# Patient Record
Sex: FEMALE | Race: WHITE | NOT HISPANIC OR LATINO | Employment: FULL TIME | URBAN - METROPOLITAN AREA
[De-identification: names, ages, dates, MRNs, and addresses within clinical notes are randomized per-mention and may not be internally consistent; named-entity substitution may affect disease eponyms.]

---

## 2017-11-22 ENCOUNTER — OFFICE VISIT (OUTPATIENT)
Dept: OBSTETRICS AND GYNECOLOGY | Facility: CLINIC | Age: 47
End: 2017-11-22

## 2017-11-22 VITALS
HEIGHT: 67 IN | WEIGHT: 293 LBS | DIASTOLIC BLOOD PRESSURE: 88 MMHG | SYSTOLIC BLOOD PRESSURE: 132 MMHG | BODY MASS INDEX: 45.99 KG/M2

## 2017-11-22 DIAGNOSIS — Z00.00 ANNUAL PHYSICAL EXAM: Primary | ICD-10-CM

## 2017-11-22 DIAGNOSIS — Z01.419 ENCOUNTER FOR GYNECOLOGICAL EXAMINATION WITHOUT ABNORMAL FINDING: ICD-10-CM

## 2017-11-22 LAB
BILIRUB BLD-MCNC: NEGATIVE MG/DL
CLARITY, POC: CLEAR
COLOR UR: YELLOW
GLUCOSE UR STRIP-MCNC: NEGATIVE MG/DL
KETONES UR QL: NEGATIVE
LEUKOCYTE EST, POC: NEGATIVE
NITRITE UR-MCNC: NEGATIVE MG/ML
PH UR: 5 [PH] (ref 5–8)
PROT UR STRIP-MCNC: NEGATIVE MG/DL
RBC # UR STRIP: NEGATIVE /UL
SP GR UR: 1 (ref 1–1.03)
UROBILINOGEN UR QL: NORMAL

## 2017-11-22 PROCEDURE — 99396 PREV VISIT EST AGE 40-64: CPT | Performed by: OBSTETRICS & GYNECOLOGY

## 2017-11-22 PROCEDURE — 81002 URINALYSIS NONAUTO W/O SCOPE: CPT | Performed by: OBSTETRICS & GYNECOLOGY

## 2017-11-22 RX ORDER — AMLODIPINE BESYLATE AND BENAZEPRIL HYDROCHLORIDE 10; 20 MG/1; MG/1
1 CAPSULE ORAL DAILY
COMMUNITY

## 2017-11-22 NOTE — PROGRESS NOTES
GYN Annual Exam     CC- Here for annual exam.     Ayanna Schaefer is a 47 y.o. female established patient who presents for annual well woman exam. Periods are irregular, lasting 3 days. They are starting to space out now but she has never gone more than 6 months without a cycle.     OB History      Para Term  AB Living    2 2 2   2    SAB TAB Ectopic Multiple Live Births                Obstetric Comments    2 C/S          Menarche: 15  Current contraception: tubal ligation  History of abnormal Pap smear: no  History of abnormal mammogram: no  Family history of uterine, colon or ovarian cancer: pt told me no but has listed paternal aunts as having ovarian cancer  Family history of breast cancer: no  H/o STDs: none  Gardasil: none    Health Maintenance   Topic Date Due   • TDAP/TD VACCINES (1 - Tdap) 1991   • INFLUENZA VACCINE  2017   • HEMOGLOBIN A1C  2017   • PAP SMEAR  2017       Past Medical History:   Diagnosis Date   • Asthma 2017   • Diabetes mellitus    • DM (diabetes mellitus) 2017   • H/O Bettencourt's palsy 2017   • History of kidney stones 2017   • HTN (hypertension) 2017   • Hypertension    • Kidney stone        Past Surgical History:   Procedure Laterality Date   • ABLATION COLPOCLESIS     •  SECTION      x 2   • CHOLECYSTECTOMY     • DILATATION AND CURETTAGE     • ENDOMETRIAL ABLATION      thermachoice   • TUBAL ABDOMINAL LIGATION           Current Outpatient Prescriptions:   •  amLODIPine-benazepril (LOTREL) 10-20 MG per capsule, Take 1 capsule by mouth Daily., Disp: , Rfl:     Allergies   Allergen Reactions   • Penicillins        Social History   Substance Use Topics   • Smoking status: Never Smoker   • Smokeless tobacco: Never Used   • Alcohol use No       Family History   Problem Relation Age of Onset   • Diabetes Father    • Diabetes type II Father    • Stroke Mother    • Diabetes Brother    • No Known Problems Son    •  "Lung cancer Paternal Grandmother    • No Known Problems Son    • Ovarian cancer Paternal Aunt    • Ovarian cancer Paternal Aunt    • Ovarian cancer Paternal Aunt    • Ovarian cancer Paternal Aunt    • Breast cancer Neg Hx    • Colon cancer Neg Hx        Review of Systems   Constitutional: Positive for unexpected weight change (weight watchers, losing). Negative for appetite change, fatigue and fever.   Respiratory: Negative for cough and shortness of breath.    Cardiovascular: Negative for chest pain and palpitations.   Gastrointestinal: Negative for abdominal distention, abdominal pain, constipation, diarrhea and nausea.   Endocrine: Negative for cold intolerance and heat intolerance.   Genitourinary: Negative for dyspareunia, dysuria, menstrual problem, pelvic pain and vaginal discharge.   Skin: Negative for color change and rash.   Neurological: Negative for headaches.   Psychiatric/Behavioral: Negative for dysphoric mood. The patient is not nervous/anxious.        /88  Ht 67\" (170.2 cm)  Wt 300 lb (136 kg)  LMP 10/28/2017  BMI 46.99 kg/m2    Physical Exam   Constitutional: She is oriented to person, place, and time. She appears well-developed and well-nourished.   HENT:   Head: Normocephalic and atraumatic.   Neck: Neck supple. No thyromegaly present.   Cardiovascular: Normal rate and regular rhythm.    Pulmonary/Chest: Effort normal and breath sounds normal. Right breast exhibits no inverted nipple, no mass, no nipple discharge, no skin change and no tenderness. Left breast exhibits no inverted nipple, no mass, no nipple discharge, no skin change and no tenderness.   Abdominal: Soft. Bowel sounds are normal. She exhibits no distension and no mass. There is no tenderness. No hernia.   Genitourinary: Uterus normal. Pelvic exam was performed with patient supine. There is no rash, tenderness or lesion on the right labia. There is no rash, tenderness or lesion on the left labia. Cervix exhibits no motion " tenderness, no discharge and no friability. Right adnexum displays no mass, no tenderness and no fullness. Left adnexum displays no mass, no tenderness and no fullness. No erythema, tenderness or bleeding in the vagina. No foreign body in the vagina. No signs of injury around the vagina. No vaginal discharge found.   Neurological: She is oriented to person, place, and time.   Skin: Skin is warm and dry.   Psychiatric: She has a normal mood and affect. Her behavior is normal. Judgment and thought content normal.   Nursing note and vitals reviewed.         Assessment/Plan    1) GYN HM: chcek pap/HPV   SBE demonstrated and encouraged. D/w pt that she is still at risk of hyperplasia given her weight and should have any excessive VB investigated.  2) STD screening: declines. Condoms encouraged.  3) Contraception: s/p BTL.  4) Family Planning: no plans., encourage folic acid daily  5) Diet and Exercise discussed  6) Smoking Status: non smoker.  7)Faily history of ovarian cancer? Will mail her info on MY RISK.  8) MMG-  Schedule.   9)Follow up prn or 1 year       Ayanna was seen today for gynecologic exam.    Diagnoses and all orders for this visit:    Annual physical exam  -     POC Urinalysis Dipstick  -     Pap IG, Rfx HPV ASCU - ThinPrep Vial, Cervix  -     Mammo Screening Bilateral With CAD; Future  -     Mammo Screening Bilateral With CAD; Future    Encounter for gynecological examination without abnormal finding          Radha Ramirez MD  11/25/2017  11:26 AM

## 2017-11-25 PROBLEM — N92.0 MENORRHAGIA: Status: ACTIVE | Noted: 2017-11-25

## 2017-11-25 PROBLEM — E11.9 DM (DIABETES MELLITUS) (HCC): Status: ACTIVE | Noted: 2017-11-25

## 2017-11-25 PROBLEM — Z87.442 HISTORY OF KIDNEY STONES: Status: ACTIVE | Noted: 2017-11-25

## 2017-11-25 PROBLEM — Z86.69 H/O BELL'S PALSY: Status: ACTIVE | Noted: 2017-11-25

## 2017-11-25 PROBLEM — I10 HTN (HYPERTENSION): Status: ACTIVE | Noted: 2017-11-25

## 2017-11-25 PROBLEM — J45.909 ASTHMA: Status: ACTIVE | Noted: 2017-11-25

## 2017-11-26 LAB
CONV .: NORMAL
CYTOLOGIST CVX/VAG CYTO: NORMAL
CYTOLOGY CVX/VAG DOC THIN PREP: NORMAL
DX ICD CODE: NORMAL
HIV 1 & 2 AB SER-IMP: NORMAL
OTHER STN SPEC: NORMAL
PATH REPORT.FINAL DX SPEC: NORMAL
STAT OF ADQ CVX/VAG CYTO-IMP: NORMAL